# Patient Record
Sex: MALE | Race: OTHER
[De-identification: names, ages, dates, MRNs, and addresses within clinical notes are randomized per-mention and may not be internally consistent; named-entity substitution may affect disease eponyms.]

---

## 2022-11-30 PROBLEM — Z00.00 ENCOUNTER FOR PREVENTIVE HEALTH EXAMINATION: Status: ACTIVE | Noted: 2022-11-30

## 2022-12-08 ENCOUNTER — APPOINTMENT (OUTPATIENT)
Dept: HEART AND VASCULAR | Facility: CLINIC | Age: 57
End: 2022-12-08

## 2022-12-08 ENCOUNTER — NON-APPOINTMENT (OUTPATIENT)
Age: 57
End: 2022-12-08

## 2022-12-08 VITALS
HEART RATE: 89 BPM | WEIGHT: 258 LBS | OXYGEN SATURATION: 95 % | HEIGHT: 65 IN | SYSTOLIC BLOOD PRESSURE: 126 MMHG | DIASTOLIC BLOOD PRESSURE: 94 MMHG | BODY MASS INDEX: 42.99 KG/M2 | TEMPERATURE: 98.4 F

## 2022-12-08 DIAGNOSIS — I50.20 UNSPECIFIED SYSTOLIC (CONGESTIVE) HEART FAILURE: ICD-10-CM

## 2022-12-08 PROCEDURE — 93000 ELECTROCARDIOGRAM COMPLETE: CPT

## 2022-12-08 PROCEDURE — 99204 OFFICE O/P NEW MOD 45 MIN: CPT | Mod: 25

## 2022-12-08 RX ORDER — ATORVASTATIN CALCIUM 40 MG/1
40 TABLET, FILM COATED ORAL
Qty: 90 | Refills: 0 | Status: ACTIVE | COMMUNITY
Start: 2022-10-28

## 2022-12-08 RX ORDER — EMPAGLIFLOZIN 10 MG/1
10 TABLET, FILM COATED ORAL
Qty: 90 | Refills: 0 | Status: ACTIVE | COMMUNITY
Start: 2022-11-16

## 2022-12-08 RX ORDER — ASPIRIN 81 MG/1
81 TABLET, CHEWABLE ORAL
Qty: 90 | Refills: 0 | Status: ACTIVE | COMMUNITY
Start: 2022-10-28

## 2022-12-08 NOTE — HISTORY OF PRESENT ILLNESS
[FreeTextEntry1] : 58 y/o M with pmhx of HLD, obesity with worsening SOB with ECHO showing HFrEF EF 20%. seen in [facility] cardiology office with Dr. Dickens. \par first started noticing SOB few week before admission to [facility] in [place] 2022. then was seen in cardiology offiice with following workup. \par \par Today, patient denies chest pain, sob, palpitations, n/v, abdominal pain, fever/chills, headache, and dizziness.\par has lost 30 lbs. walks for 30-40mins w/o stopping. sleeps on side. no orthopnoea. leg swelling improved. \par \par Occupation: drives  bus. \par Previous COVID-19 Infection: No\par FHx: no family hx of premature CAD\par Social Hx: never smoker, drink, no drug use. \par \par Imaging:\par Echo 11/11/22: severe global hypokinesis of LV with EF 20-25%. Trace mitral regurgitation. RV mild dysfunction, LVEDD 6.1cms\par LHC: 11/16: Normal Coronaries, LVEDP 30\par \par Home meds: furosemide 20 mg, carvedilol 3.125 mg BID, ASA 81 mg, atorvastatin 40 mg\par Entresto 24-26mg BID\par \par Labs 11/15/22\par WBC 8.8 Hgb 16.3 Hct 52.9 Plt 307\par Na+ 142 K+ 4.9 BUN 18 creat 1.3 eGFR 60.1 Glu 109\par A1c 6.1\par NT- PRO BNP 1490\par Chol 123 LDL 61 HDL 42 \par SARS-CoV-2 PCR 11/15/22: Not Detected

## 2022-12-08 NOTE — PHYSICAL EXAM
[Normal Venous Pressure] : normal venous pressure [Normal S1, S2] : normal S1, S2 [No Murmur] : no murmur [Clear Lung Fields] : clear lung fields [Soft] : abdomen soft [Non Tender] : non-tender [No Rash] : no rash [Alert and Oriented] : alert and oriented [Normal memory] : normal memory [de-identified] : trace LE oedema

## 2022-12-08 NOTE — ASSESSMENT
[FreeTextEntry1] : 58 y/o M with pmhx of HLD, obesity with worsening SOB with ECHO showing HFrEF EF 20%, LHC normal coronaries. \par \par Imp:\par ACC/AHA stage C HF, NYHA class 2\par NICM\par \par Plan:\par euvolemic --> lasix 20mg daily\par Entresto 24-26mg BID\par increase Coreg 6.25mg BID \par Jardiance 10 mg daily\par CMP today, based on that will increase entresto. \par daily weights and BP\par low salt diet, fluid restriction < 2lits\par follow up in 1 month with [employer]  [facility]\par will repeat ECHO in 3 months after GDMT optimization\par no device \par QRS: 108msec\par

## 2022-12-08 NOTE — REVIEW OF SYSTEMS
[FreeTextEntry1] : 14 points review of systems performed and is negative aside from what is mentioned in HPI

## 2022-12-12 LAB
ALBUMIN SERPL ELPH-MCNC: 4.6 G/DL
ALP BLD-CCNC: 78 U/L
ALT SERPL-CCNC: 22 U/L
ANION GAP SERPL CALC-SCNC: 11 MMOL/L
AST SERPL-CCNC: 17 U/L
BILIRUB SERPL-MCNC: 1.1 MG/DL
BUN SERPL-MCNC: 15 MG/DL
CALCIUM SERPL-MCNC: 9.8 MG/DL
CHLORIDE SERPL-SCNC: 103 MMOL/L
CO2 SERPL-SCNC: 26 MMOL/L
CREAT SERPL-MCNC: 1.21 MG/DL
EGFR: 69 ML/MIN/1.73M2
GLUCOSE SERPL-MCNC: 79 MG/DL
NT-PROBNP SERPL-MCNC: 697 PG/ML
POTASSIUM SERPL-SCNC: 4.9 MMOL/L
PROT SERPL-MCNC: 7 G/DL
SODIUM SERPL-SCNC: 140 MMOL/L

## 2023-03-23 ENCOUNTER — NON-APPOINTMENT (OUTPATIENT)
Age: 58
End: 2023-03-23

## 2023-03-23 ENCOUNTER — APPOINTMENT (OUTPATIENT)
Dept: HEART AND VASCULAR | Facility: CLINIC | Age: 58
End: 2023-03-23
Payer: COMMERCIAL

## 2023-03-23 VITALS
HEART RATE: 91 BPM | WEIGHT: 254 LBS | OXYGEN SATURATION: 96 % | SYSTOLIC BLOOD PRESSURE: 136 MMHG | BODY MASS INDEX: 42.32 KG/M2 | TEMPERATURE: 97.8 F | DIASTOLIC BLOOD PRESSURE: 90 MMHG | HEIGHT: 65 IN

## 2023-03-23 PROCEDURE — 99214 OFFICE O/P EST MOD 30 MIN: CPT | Mod: 25

## 2023-03-23 PROCEDURE — 93000 ELECTROCARDIOGRAM COMPLETE: CPT

## 2023-03-23 RX ORDER — FUROSEMIDE 20 MG/1
20 TABLET ORAL
Qty: 30 | Refills: 2 | Status: ACTIVE | COMMUNITY
Start: 2022-10-28

## 2023-03-23 RX ORDER — CARVEDILOL 12.5 MG/1
12.5 TABLET, FILM COATED ORAL TWICE DAILY
Qty: 180 | Refills: 3 | Status: ACTIVE | COMMUNITY
Start: 2022-11-14 | End: 1900-01-01

## 2023-03-23 NOTE — PHYSICAL EXAM
[Normal Venous Pressure] : normal venous pressure [Normal S1, S2] : normal S1, S2 [No Murmur] : no murmur [Clear Lung Fields] : clear lung fields [Soft] : abdomen soft [Non Tender] : non-tender [No Rash] : no rash [Alert and Oriented] : alert and oriented [Normal memory] : normal memory [de-identified] : trace LE oedema

## 2023-03-23 NOTE — ASSESSMENT
[FreeTextEntry1] : 56 y/o M with pmhx of HLD, obesity with worsening SOB with ECHO showing HFrEF EF 20%, LHC normal coronaries. \par \par Imp:\par ACC/AHA stage C HF, NYHA class 2\par NICM\par \par Plan:\par euvolemic --> lasix 20mg prn\par Entresto 24-26mg BID --> will increase after BMP today\par increase Coreg 12.5mg BID \par Jardiance 10 mg daily\par CMP today, based on that will increase entresto. \par daily weights and BP\par low salt diet, fluid restriction < 2lits\par follow up in 1-2month \par will repeat ECHO in 2 months after GDMT optimization\par no device \par QRS: 108msec\par

## 2023-03-23 NOTE — HISTORY OF PRESENT ILLNESS
[FreeTextEntry1] : 58 y/o M with pmhx of HLD, obesity with worsening SOB with ECHO showing HFrEF EF 20%. seen in [facility] cardiology office with Dr. Dickens. first started noticing SOB few week before admission to [facility] in [place] 2022. then was seen in cardiology offiice with following workup. \par \par Today presents for follow up. patient denies chest pain, sob, palpitations, n/v, abdominal pain, fever/chills, headache, and dizziness.\par has lost 30 lbs. walks for 30-40mins w/o stopping. sleeps on side. no orthopnoea. leg swelling improved. \par \par Occupation: drives  bus. \par Previous COVID-19 Infection: No\par FHx: no family hx of premature CAD\par Social Hx: never smoker, drink, no drug use. \par \par Imaging:\par Echo 11/11/22: severe global hypokinesis of LV with EF 20-25%. Trace mitral regurgitation. RV mild dysfunction, LVEDD 6.1cms\par LHC: 11/16: Normal Coronaries, LVEDP 30\par \par Home meds: furosemide 20 mg, carvedilol 3.125 mg BID, ASA 81 mg, atorvastatin 40 mg\par Entresto 24-26mg BID\par \par Labs 11/15/22\par WBC 8.8 Hgb 16.3 Hct 52.9 Plt 307\par Na+ 142 K+ 4.9 BUN 18 creat 1.3 eGFR 60.1 Glu 109\par A1c 6.1\par NT- PRO BNP 1490\par Chol 123 LDL 61 HDL 42 \par SARS-CoV-2 PCR 11/15/22: Not Detected

## 2023-03-24 LAB
ALBUMIN SERPL ELPH-MCNC: 4.7 G/DL
ALP BLD-CCNC: 79 U/L
ALT SERPL-CCNC: 20 U/L
ANION GAP SERPL CALC-SCNC: 14 MMOL/L
AST SERPL-CCNC: 23 U/L
BILIRUB SERPL-MCNC: 0.9 MG/DL
BUN SERPL-MCNC: 16 MG/DL
CALCIUM SERPL-MCNC: 10.2 MG/DL
CHLORIDE SERPL-SCNC: 104 MMOL/L
CO2 SERPL-SCNC: 24 MMOL/L
CREAT SERPL-MCNC: 1.1 MG/DL
EGFR: 77 ML/MIN/1.73M2
GLUCOSE SERPL-MCNC: 81 MG/DL
NT-PROBNP SERPL-MCNC: 494 PG/ML
POTASSIUM SERPL-SCNC: 5 MMOL/L
PROT SERPL-MCNC: 7.2 G/DL
SODIUM SERPL-SCNC: 142 MMOL/L

## 2023-05-11 ENCOUNTER — APPOINTMENT (OUTPATIENT)
Dept: HEART AND VASCULAR | Facility: CLINIC | Age: 58
End: 2023-05-11
Payer: COMMERCIAL

## 2023-05-11 VITALS
BODY MASS INDEX: 43.9 KG/M2 | DIASTOLIC BLOOD PRESSURE: 92 MMHG | SYSTOLIC BLOOD PRESSURE: 128 MMHG | TEMPERATURE: 97.4 F | OXYGEN SATURATION: 95 % | HEIGHT: 65 IN | WEIGHT: 263.5 LBS | HEART RATE: 73 BPM

## 2023-05-11 PROCEDURE — 99214 OFFICE O/P EST MOD 30 MIN: CPT

## 2023-05-11 NOTE — PHYSICAL EXAM
[Normal Venous Pressure] : normal venous pressure [Normal S1, S2] : normal S1, S2 [No Murmur] : no murmur [Clear Lung Fields] : clear lung fields [Soft] : abdomen soft [Non Tender] : non-tender [No Rash] : no rash [Alert and Oriented] : alert and oriented [Normal memory] : normal memory [No Acute Distress] : no acute distress [de-identified] : trace LE oedema

## 2023-05-11 NOTE — HISTORY OF PRESENT ILLNESS
[FreeTextEntry1] : 58 y/o M with pmhx of HLD, obesity, NICM HFrEF ( LVIDd 6.1cm, LVEF 20-25%). He presents today for follow-up of his HF.\par \par Heart Failure history started when Mr. Ambriz noticed worsening shortness of breath, which prompted an admission to [facility] in OCT 2022.  An ECHO showed an EF 20%. He later followed up in the [facility] cardiology office with Dr. Dickens. \par \par Imaging:\par Echo 11/11/22: severe global hypokinesis of LV with EF 20-25%. Trace mitral regurgitation. RV mild dysfunction, LVEDD 6.1cms\par LHC: 11/16: Normal Coronaries, LVEDP 30\par \par 5/11:\par doing well, no SOB, leg swelling, orthopnea or PND. tolerating higher dose entresto w/o dizziness. no palpitation or syncope. \par \par \par \par \par

## 2023-05-11 NOTE — CARDIOLOGY SUMMARY
[de-identified] : \par Echo 11/11/22: severe global hypokinesis of LV with EF 20-25%. Trace mitral regurgitation. RV mild dysfunction, LVEDD 6.1cms\par \par \par  [de-identified] : \par [facility]: 11/16: Normal Coronaries, LVEDP 30

## 2023-05-11 NOTE — ASSESSMENT
[FreeTextEntry1] : 56 y/o M with pmhx of HLD, obesity with worsening SOB with ECHO showing HFrEF EF 20%, LHC normal coronaries. ACC/AHA stage C endorsing NYHA class \par \par NICM HFrEF\par -euvolemic --> lasix 20mg prn\par -Entresto 49-51mg BID --> will increase after BMP today\par -cont Coreg 12.5mg BID \par -Jardiance 10 mg daily\par -daily weights and BP\par -low salt diet, fluid restriction < 2lits\par -follow up in 2month with repeat ECHO in 2 months after GDMT optimization\par -no device \par -QRS: 108msec\par \par HLD \par - Continue  Atrovastatin Calcium 40 mg QD\par \par

## 2023-07-13 ENCOUNTER — APPOINTMENT (OUTPATIENT)
Dept: HEART AND VASCULAR | Facility: CLINIC | Age: 58
End: 2023-07-13
Payer: COMMERCIAL

## 2023-07-13 VITALS
HEIGHT: 65 IN | HEART RATE: 86 BPM | DIASTOLIC BLOOD PRESSURE: 96 MMHG | TEMPERATURE: 98 F | OXYGEN SATURATION: 96 % | BODY MASS INDEX: 45.01 KG/M2 | SYSTOLIC BLOOD PRESSURE: 122 MMHG | WEIGHT: 270.13 LBS

## 2023-07-13 LAB
ANION GAP SERPL CALC-SCNC: 16 MMOL/L
BUN SERPL-MCNC: 22 MG/DL
CALCIUM SERPL-MCNC: 9.8 MG/DL
CHLORIDE SERPL-SCNC: 104 MMOL/L
CO2 SERPL-SCNC: 21 MMOL/L
CREAT SERPL-MCNC: 1.07 MG/DL
EGFR: 80 ML/MIN/1.73M2
GLUCOSE SERPL-MCNC: 87 MG/DL
NT-PROBNP SERPL-MCNC: 314 PG/ML
POTASSIUM SERPL-SCNC: 5 MMOL/L
SODIUM SERPL-SCNC: 140 MMOL/L

## 2023-07-13 PROCEDURE — 99214 OFFICE O/P EST MOD 30 MIN: CPT

## 2023-07-13 NOTE — HISTORY OF PRESENT ILLNESS
[FreeTextEntry1] : 58 y/o M with pmhx of HLD, obesity, NICM HFrEF ( LVIDd 6.1cm, LVEF 20-25%). He presents today for follow-up of his HF.\par \par Heart Failure history started when Mr. Ambriz noticed worsening shortness of breath, which prompted an admission to [facility] in OCT 2022.  An ECHO showed an EF 20%. He later followed up in the [facility] cardiology office with Dr. Dickens. \par \par Pt denies any new symptoms. No SOB, PND, orthopnoea, leg swelling. walsk > 1hr daily w/o symptoms. no CP, dizziness. \par \par Imaging:\par Echo 11/11/22: severe global hypokinesis of LV with EF 20-25%. Trace mitral regurgitation. RV mild dysfunction, LVEDD 6.1cms\par LHC: 11/16: Normal Coronaries, LVEDP 30\par \par 5/11:\par doing well, no SOB, leg swelling, orthopnea or PND. tolerating higher dose entresto w/o dizziness. no palpitation or syncope. \par \par \par \par \par

## 2023-07-13 NOTE — CARDIOLOGY SUMMARY
[de-identified] : \par Echo 11/11/22: severe global hypokinesis of LV with EF 20-25%. Trace mitral regurgitation. RV mild dysfunction, LVEDD 6.1cms\par \par \par  [de-identified] : \par [facility]: 11/16: Normal Coronaries, LVEDP 30

## 2023-07-13 NOTE — ASSESSMENT
[FreeTextEntry1] : 56 y/o M with pmhx of HLD, obesity with worsening SOB with ECHO showing HFrEF EF 20%, LHC normal coronaries. ACC/AHA stage C endorsing NYHA class \par \par TTE 7/13: EF 25%, non dilated LV, LVH, mild MR. \par \par NICM HFrEF--> likley Hypertensive \par NYHA class 2\par -euvolemic --> lasix 20mg prn \par -Entresto 49-51mg BID --> will increase after BMP today\par -cont Coreg 12.5mg BID --> can increase next\par -Jardiance 10 mg daily\par -daily weights and BP\par -low salt diet, fluid restriction < 2lits\par -follow up in 2month with repeat ECHO in 2 months after GDMT optimization\par -no device \par -QRS: 108msec\par - EP  consult \par \par HLD \par - Continue  Atrovastatin Calcium 40 mg QD\par \par - sleep study referral \par \par \par

## 2023-07-13 NOTE — PHYSICAL EXAM
[No Acute Distress] : no acute distress [Normal Venous Pressure] : normal venous pressure [Normal S1, S2] : normal S1, S2 [No Murmur] : no murmur [Clear Lung Fields] : clear lung fields [Soft] : abdomen soft [Non Tender] : non-tender [No Rash] : no rash [Alert and Oriented] : alert and oriented [Normal memory] : normal memory [de-identified] : No LE edema

## 2023-07-14 LAB
ANION GAP SERPL CALC-SCNC: 13 MMOL/L
BUN SERPL-MCNC: 15 MG/DL
CALCIUM SERPL-MCNC: 9.7 MG/DL
CHLORIDE SERPL-SCNC: 105 MMOL/L
CO2 SERPL-SCNC: 23 MMOL/L
CREAT SERPL-MCNC: 1.24 MG/DL
EGFR: 67 ML/MIN/1.73M2
GLUCOSE SERPL-MCNC: 93 MG/DL
POTASSIUM SERPL-SCNC: 5 MMOL/L
SODIUM SERPL-SCNC: 141 MMOL/L

## 2023-07-14 RX ORDER — SACUBITRIL AND VALSARTAN 97; 103 MG/1; MG/1
97-103 TABLET, FILM COATED ORAL
Refills: 0 | Status: ACTIVE | COMMUNITY
Start: 2022-11-16

## 2023-09-07 ENCOUNTER — APPOINTMENT (OUTPATIENT)
Dept: HEART AND VASCULAR | Facility: CLINIC | Age: 58
End: 2023-09-07

## 2023-09-26 ENCOUNTER — APPOINTMENT (OUTPATIENT)
Dept: HEART AND VASCULAR | Facility: CLINIC | Age: 58
End: 2023-09-26